# Patient Record
Sex: MALE | Race: WHITE | Employment: FULL TIME | URBAN - METROPOLITAN AREA
[De-identification: names, ages, dates, MRNs, and addresses within clinical notes are randomized per-mention and may not be internally consistent; named-entity substitution may affect disease eponyms.]

---

## 2019-08-20 ENCOUNTER — OFFICE VISIT (OUTPATIENT)
Dept: FAMILY MEDICINE | Facility: CLINIC | Age: 29
End: 2019-08-20
Payer: COMMERCIAL

## 2019-08-20 VITALS
TEMPERATURE: 97.8 F | BODY MASS INDEX: 23.55 KG/M2 | WEIGHT: 159 LBS | SYSTOLIC BLOOD PRESSURE: 124 MMHG | OXYGEN SATURATION: 97 % | RESPIRATION RATE: 14 BRPM | HEIGHT: 69 IN | HEART RATE: 59 BPM | DIASTOLIC BLOOD PRESSURE: 80 MMHG

## 2019-08-20 DIAGNOSIS — M75.41 IMPINGEMENT SYNDROME, SHOULDER, RIGHT: ICD-10-CM

## 2019-08-20 DIAGNOSIS — Z00.00 ROUTINE GENERAL MEDICAL EXAMINATION AT A HEALTH CARE FACILITY: Primary | ICD-10-CM

## 2019-08-20 PROCEDURE — 99213 OFFICE O/P EST LOW 20 MIN: CPT | Mod: 25 | Performed by: FAMILY MEDICINE

## 2019-08-20 PROCEDURE — 99385 PREV VISIT NEW AGE 18-39: CPT | Performed by: FAMILY MEDICINE

## 2019-08-20 RX ORDER — MELOXICAM 15 MG/1
15 TABLET ORAL DAILY
Qty: 30 TABLET | Refills: 1 | Status: SHIPPED | OUTPATIENT
Start: 2019-08-20 | End: 2020-09-01

## 2019-08-20 SDOH — HEALTH STABILITY: MENTAL HEALTH: HOW OFTEN DO YOU HAVE A DRINK CONTAINING ALCOHOL?: 2-4 TIMES A MONTH

## 2019-08-20 SDOH — HEALTH STABILITY: MENTAL HEALTH: HOW MANY STANDARD DRINKS CONTAINING ALCOHOL DO YOU HAVE ON A TYPICAL DAY?: 3 OR 4

## 2019-08-20 ASSESSMENT — MIFFLIN-ST. JEOR: SCORE: 1681.6

## 2019-08-20 NOTE — PROGRESS NOTES
SUBJECTIVE:   CC: Diony Salter is an 29 year old male who presents for preventative health visit.     Healthy Habits:     Getting at least 3 servings of Calcium per day:  NO    Bi-annual eye exam:  Yes    Dental care twice a year:  NO    Sleep apnea or symptoms of sleep apnea:  None    Diet:  Regular (no restrictions)    Frequency of exercise:  2-3 days/week    Duration of exercise:  Greater than 60 minutes    Taking medications regularly:  Yes    Medication side effects:  None    PHQ-2 Total Score: 1    Additional concerns today:  No      in addition to health maintenance patient would like to discuss the following problem:  ---    Also is having R shoulder pain, throwing pain, no pain with pretend throwing but actual throwing hurts  Has played baseball for most of his life  Playing in college  3 weeks ago faint, plays every Sunday  progressively worse    Severity: mild to moderate  Mechanism: just throwing  History of: nothing like this in the past  Left shoulder separation, snowboarding  Work related:   ROS: As per rest of visit.  Neuro: no significant weakness, numbness, tingling.    Problem specific EXAM  CV: Peripheral UE pulses are palpable.  L Shoulder Exam: full ROM circumduction. No effusion crepitus redness or deformity. No tenderness deltoid, AC, biceps groove, acromion, coracoid, supraspinatous, infraspinatous. Apprehension sign NEG.  R Shoulder exam: shows positive impingement signs are present with pain at high arc of abduction and forward flexion on right. There is tenderness of the lateral shoulder.  Focal Neurological exam of the arms reveals normal without focal findings. DTR's, motor strength and sensation normal.    X-ray is not indicated, no direct fall or trauma  ---    Discussed the natural history of throwing injuries  He is having some impingement type symptoms but also some possible tendinitis or bursitis  Because of a high velocity throwing a labral tear is also possibility  For now we  will focus on rehabilitation and refer him to physical therapy as well started a prescription anti-inflammatory    If this does not seem to help we will refer him to sports medicine for MRI and discussion of possible  surgical treatment      Today's PHQ-2 Score:   PHQ-2 ( 1999 [employer]) 8/20/2019   Q1: Little interest or pleasure in doing things 1   Q2: Feeling down, depressed or hopeless 0   PHQ-2 Score 1   Q1: Little interest or pleasure in doing things Several days   Q2: Feeling down, depressed or hopeless Not at all   PHQ-2 Score 1       Abuse: Current or Past(Physical, Sexual or Emotional)- No  Do you feel safe in your environment? Yes    Social History     Tobacco Use     Smoking status: Never Smoker     Smokeless tobacco: Current User     Types: Snuff   Substance Use Topics     Alcohol use: Yes     Frequency: 2-4 times a month     Drinks per session: 3 or 4     If you drink alcohol do you typically have >3 drinks per day or >7 drinks per week? No    Alcohol Use 8/20/2019   Prescreen: >3 drinks/day or >7 drinks/week? No   Prescreen: >3 drinks/day or >7 drinks/week? -   No flowsheet data found.    Last PSA: No results found for: PSA    Reviewed orders with patient. Reviewed health maintenance and updated orders accordingly - Yes  Lab work is in process  Labs reviewed in EPIC  BP Readings from Last 3 Encounters:   08/20/19 124/80    Wt Readings from Last 3 Encounters:   08/20/19 72.1 kg (159 lb)                  Patient Active Problem List   Diagnosis     Impingement syndrome, shoulder, right     No past surgical history on file.    Social History     Tobacco Use     Smoking status: Never Smoker     Smokeless tobacco: Current User     Types: Snuff   Substance Use Topics     Alcohol use: Yes     Frequency: 2-4 times a month     Drinks per session: 3 or 4     Family History   Problem Relation Age of Onset     Diabetes Mother      Diabetes Father            Reviewed and updated as needed this visit by clinical  "staff  Tobacco  Allergies  Meds  Fam Hx  Soc Hx        Reviewed and updated as needed this visit by Provider  Tobacco  Fam Hx  Soc Hx           Review of Systems  CONSTITUTIONAL: NEGATIVE for fever, chills, change in weight  INTEGUMENTARY/SKIN: NEGATIVE for worrisome rashes, moles or lesions  EYES: NEGATIVE for vision changes or irritation  ENT: NEGATIVE for ear, mouth and throat problems  RESP: NEGATIVE for significant cough or SOB  CV: NEGATIVE for chest pain, palpitations or peripheral edema  GI: NEGATIVE for nausea, abdominal pain, heartburn, or change in bowel habits   male: negative for dysuria, hematuria, decreased urinary stream, erectile dysfunction, urethral discharge  MUSCULOSKELETAL: NEGATIVE for significant arthralgias or myalgia except as noted above  NEURO: NEGATIVE for weakness, dizziness or paresthesias  PSYCHIATRIC: NEGATIVE for changes in mood or affect    OBJECTIVE:   /80   Pulse 59   Temp 97.8  F (36.6  C) (Oral)   Resp 14   Ht 1.753 m (5' 9\")   Wt 72.1 kg (159 lb)   SpO2 97%   BMI 23.48 kg/m      Physical Exam    General Appearance: Pleasant, alert, in no acute respiratory distress.  Head Exam: Normal. Normocephalic, atraumatic. No sinus tenderness.  Eye Exam: Normal external eye, conjunctiva, lids. MARINA.  Ear Exam: Normal auditory canals and external ears. Non-tender.  Left TM-normal. Right TM-normal.  OroPharynx Exam: Dental hygiene adequate. Normal buccal mucosa. Normal pharynx.  Neck Exam: Supple, no masses or enlarged, tender nodes.  Thyroid Exam: No nodules or enlargement or tenderness.  Chest/Respiratory Exam: Normal, comfortable, easy respirations. Chest wall normal. Lungs are clear to auscultation. No wheezing, crackles, or rhonchi.  Cardiovascular Exam: Regular rate and rhythm. No murmur, gallop, or rubs. No pedal edema.  Gastrointestinal Exam: Soft, non-tender, no masses or organomegaly.  Musculoskeletal Exam: Back is non-tender, full ROM of upper and lower " "extremities.  Except as noted above  Skin: no rash, warm and dry.    Neurologic Exam: Nonfocal, no tremor. Normal gait.  Psychiatric Exam: Alert - appropriate, normal affect    ASSESSMENT/PLAN:       ICD-10-CM    1. Routine general medical examination at a health care facility Z00.00    2. Impingement syndrome, shoulder, right M75.41 meloxicam (MOBIC) 15 MG tablet     [facility], [facility] CHIROPRACT[facility] REFERRAL     SPORTS MEDICINE REFERRAL       COUNSELING:   Reviewed preventive health counseling, as reflected in patient instructions       Regular exercise       Healthy diet/nutrition    Estimated body mass index is 23.48 kg/m  as calculated from the following:    Height as of this encounter: 1.753 m (5' 9\").    Weight as of this encounter: 72.1 kg (159 lb).          reports that he has never smoked. His smokeless tobacco use includes snuff.      Counseling Resources:  ATP IV Guidelines  Pooled Cohorts Equation Calculator  FRAX Risk Assessment  ICSI Preventive Guidelines  Dietary Guidelines for Americans, 2010  USDA's MyPlate  ASA Prophylaxis  Lung CA Screening    Vicente Chucho Vegas MD  [facility]  "

## 2019-08-22 NOTE — TELEPHONE ENCOUNTER
RECORDS RECEIVED FROM: Internal referral from Vicente Vegas MD for Impingement syndrome, shoulder, right - Schedule Per PT    DATE RECEIVED: 8/22   NOTES STATUS DETAILS   OFFICE NOTE from referring provider Internal    OFFICE NOTE from other specialist N/A    DISCHARGE SUMMARY from hospital N/A    DISCHARGE REPORT from the ER N/A    OPERATIVE REPORT N/A    MEDICATION LIST Internal    MRI N/A    CT SCAN N/A    XRAYS (IMAGES & REPORTS) N/A    TUMOR     PATHOLOGY  Slides & report N/A

## 2019-08-27 ENCOUNTER — OFFICE VISIT (OUTPATIENT)
Dept: ORTHOPEDICS | Facility: CLINIC | Age: 29
End: 2019-08-27
Attending: FAMILY MEDICINE
Payer: COMMERCIAL

## 2019-08-27 ENCOUNTER — PRE VISIT (OUTPATIENT)
Dept: ORTHOPEDICS | Facility: CLINIC | Age: 29
End: 2019-08-27

## 2019-08-27 VITALS — WEIGHT: 159 LBS | BODY MASS INDEX: 23.55 KG/M2 | HEIGHT: 69 IN

## 2019-08-27 DIAGNOSIS — M25.511 PAIN IN JOINT OF RIGHT SHOULDER: Primary | ICD-10-CM

## 2019-08-27 ASSESSMENT — PAIN SCALES - GENERAL: PAINLEVEL: NO PAIN (0)

## 2019-08-27 ASSESSMENT — MIFFLIN-ST. JEOR: SCORE: 1681.6

## 2019-08-27 NOTE — LETTER
"  8/27/2019      RE: Diony Salter  [address]  [facility] [address]       Pt is a 29 year old male referred by Dr Vegas here today for:     HPI:   Right Shoulder pain:   Location: \"depends on what position in throw\" - ends up going around entire shoulder   Duration: about 8 weeks   Injury? Inciting activity? Nothing specific, just from use   Radiation: none   Numbness/tingling? Yes, during games but not at rest   Weakness? No   Instability? No   Clicking/ catching? No   Imaging? None   Treatment? 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  Neuro: no significant weakness, numbness, tingling.    R Shoulder exam: shows positive impingement signs are present with pain at high arc of abduction and forward flexion on right. There is tenderness of the lateral shoulder.\"    No past medical history on file.   No past surgical history on file.   Current Outpatient Medications   Medication Sig Dispense Refill     meloxicam (MOBIC) 15 MG tablet Take 1 tablet (15 mg) by mouth daily 30 tablet 1      No Known Allergies   Social History     Tobacco Use     Smoking status: Never Smoker     Smokeless tobacco: Current User     Types: Snuff   Substance Use Topics     Alcohol use: Yes     Frequency: 2-4 times a month     Drinks per session: 3 or 4     Drug use: Never      Family History   Problem Relation Age of Onset     Diabetes Mother      Diabetes Father       ROS:   Gen- no fevers/chills   Rheum - no morning stiffness   Derm - no rash/ redness   Neuro - no " "numbness, no tingling   Remainder of ROS negative.     Exam:   Ht 1.753 m (5' 9\")   Wt 72.1 kg (159 lb)   BMI 23.48 kg/m          Right Shoulder:   Inspection: asymmetry? None; dyskinesis? None, ecchymosis on right bicep and in axilla   ROM:   Active - forward flexion - full/ abduction - full/ int rot - symmetric/ ext rot - symmetric   Passive- forward flexion - full/ abduction - full   Strength: deltoid/supraspinatous - 5/5; infraspinatous/ teres minor - 5/5; subscapularis - 5/5   Maneuvers:   RTC - empty can - trace positive; painful arc - neg; lift off - neg   Impingement - Martinez -neg; Neer- neg   AC - cross arm - neg   Labrum - Mccall's - ++; Leger shear - neg; apprehension - ++  Palpation: AC - neg; Acromion/ supraspinatus - neg; scapula - neg; bicipital groove - neg       (M25.511) Pain in joint of right shoulder  (primary encounter diagnosis)  Comment: lengthy discussion regarding his exam; unclear etiology of his axillary ecchymoses. Pec is intact. His exam is consistent w/ labral pathology. Recommended trial of PT to focus both on shoulder and throwing mechanics; f/u in 2 months; if no better, would get imaging  Plan: PHYSICAL THERAPY REFERRAL (Internal)            Shashi Patel MD  August 27, 2019  3:00 PM      Shashi Patel MD    "

## 2019-08-27 NOTE — PROGRESS NOTES
"Pt is a 29 year old male referred by Dr Vegas here today for:     HPI:   Right Shoulder pain:   Location: \"depends on what position in throw\" - ends up going around entire shoulder   Duration: about 8 weeks   Injury? Inciting activity? Nothing specific, just from use   Radiation: none   Numbness/tingling? Yes, during games but not at rest   Weakness? No   Instability? No   Clicking/ catching? No   Imaging? None   Treatment? 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  Neuro: no significant weakness, numbness, tingling.    R Shoulder exam: shows positive impingement signs are present with pain at high arc of abduction and forward flexion on right. There is tenderness of the lateral shoulder.\"    No past medical history on file.   No past surgical history on file.   Current Outpatient Medications   Medication Sig Dispense Refill     meloxicam (MOBIC) 15 MG tablet Take 1 tablet (15 mg) by mouth daily 30 tablet 1      No Known Allergies   Social History     Tobacco Use     Smoking status: Never Smoker     Smokeless tobacco: Current User     Types: Snuff   Substance Use Topics     Alcohol use: Yes     Frequency: 2-4 times a month     Drinks per session: 3 or 4     Drug use: Never      Family History   Problem Relation Age of Onset     Diabetes Mother      Diabetes Father       ROS:   Gen- no fevers/chills   Rheum - no morning stiffness   Derm - no rash/ redness   Neuro - no numbness, no tingling   Remainder of ROS negative.     Exam:   Ht 1.753 m (5' 9\")   Wt " 72.1 kg (159 lb)   BMI 23.48 kg/m         Right Shoulder:   Inspection: asymmetry? None; dyskinesis? None, ecchymosis on right bicep and in axilla   ROM:   Active - forward flexion - full/ abduction - full/ int rot - symmetric/ ext rot - symmetric   Passive- forward flexion - full/ abduction - full   Strength: deltoid/supraspinatous - 5/5; infraspinatous/ teres minor - 5/5; subscapularis - 5/5   Maneuvers:   RTC - empty can - trace positive; painful arc - neg; lift off - neg   Impingement - Martinez -neg; Neer- neg   AC - cross arm - neg   Labrum - Mccall's - ++; Leger shear - neg; apprehension - ++  Palpation: AC - neg; Acromion/ supraspinatus - neg; scapula - neg; bicipital groove - neg       (M25.511) Pain in joint of right shoulder  (primary encounter diagnosis)  Comment: lengthy discussion regarding his exam; unclear etiology of his axillary ecchymoses. Pec is intact. His exam is consistent w/ labral pathology. Recommended trial of PT to focus both on shoulder and throwing mechanics; f/u in 2 months; if no better, would get imaging  Plan: PHYSICAL THERAPY REFERRAL (Internal)            Shashi Patel MD  August 27, 2019  3:00 PM

## 2019-08-30 PROBLEM — M25.511 RIGHT SHOULDER PAIN: Status: ACTIVE | Noted: 2019-08-30

## 2020-03-11 ENCOUNTER — HEALTH MAINTENANCE LETTER (OUTPATIENT)
Age: 30
End: 2020-03-11

## 2020-09-01 ENCOUNTER — OFFICE VISIT (OUTPATIENT)
Dept: FAMILY MEDICINE | Facility: CLINIC | Age: 30
End: 2020-09-01
Payer: COMMERCIAL

## 2020-09-01 VITALS
HEIGHT: 69 IN | RESPIRATION RATE: 17 BRPM | BODY MASS INDEX: 23.99 KG/M2 | TEMPERATURE: 97.6 F | WEIGHT: 162 LBS | OXYGEN SATURATION: 96 % | HEART RATE: 79 BPM | DIASTOLIC BLOOD PRESSURE: 82 MMHG | SYSTOLIC BLOOD PRESSURE: 123 MMHG

## 2020-09-01 DIAGNOSIS — F51.02 ADJUSTMENT INSOMNIA: ICD-10-CM

## 2020-09-01 DIAGNOSIS — F41.9 ANXIETY: Primary | ICD-10-CM

## 2020-09-01 PROCEDURE — 99214 OFFICE O/P EST MOD 30 MIN: CPT | Performed by: FAMILY MEDICINE

## 2020-09-01 RX ORDER — ESCITALOPRAM OXALATE 10 MG/1
10 TABLET ORAL DAILY
Qty: 30 TABLET | Refills: 5 | Status: SHIPPED | OUTPATIENT
Start: 2020-09-01

## 2020-09-01 ASSESSMENT — PATIENT HEALTH QUESTIONNAIRE - PHQ9
5. POOR APPETITE OR OVEREATING: SEVERAL DAYS
SUM OF ALL RESPONSES TO PHQ QUESTIONS 1-9: 16

## 2020-09-01 ASSESSMENT — ANXIETY QUESTIONNAIRES
IF YOU CHECKED OFF ANY PROBLEMS ON THIS QUESTIONNAIRE, HOW DIFFICULT HAVE THESE PROBLEMS MADE IT FOR YOU TO DO YOUR WORK, TAKE CARE OF THINGS AT HOME, OR GET ALONG WITH OTHER PEOPLE: SOMEWHAT DIFFICULT
5. BEING SO RESTLESS THAT IT IS HARD TO SIT STILL: MORE THAN HALF THE DAYS
3. WORRYING TOO MUCH ABOUT DIFFERENT THINGS: NEARLY EVERY DAY
1. FEELING NERVOUS, ANXIOUS, OR ON EDGE: SEVERAL DAYS
7. FEELING AFRAID AS IF SOMETHING AWFUL MIGHT HAPPEN: SEVERAL DAYS
GAD7 TOTAL SCORE: 12
6. BECOMING EASILY ANNOYED OR IRRITABLE: SEVERAL DAYS
2. NOT BEING ABLE TO STOP OR CONTROL WORRYING: NEARLY EVERY DAY

## 2020-09-01 ASSESSMENT — MIFFLIN-ST. JEOR: SCORE: 1690.21

## 2020-09-01 NOTE — PROGRESS NOTES
"Subjective     Diony Salter is a 30 year old male who presents to clinic today for the following health issues:    HPI       Depression and Anxiety Follow-Up    How are you doing with your depression since your last visit? Worsened slightly     How are you doing with your anxiety since your last visit?  Worsened slightly     Are you having other symptoms that might be associated with depression or anxiety? Yes:  panic attacks     Have you had a significant life event? Relationship Concerns     Do you have any concerns with your use of alcohol or other drugs? Yes:  chew tobacco concerns     Social History     Tobacco Use     Smoking status: Never Smoker     Smokeless tobacco: Current User     Types: Snuff   Substance Use Topics     Alcohol use: Yes     Frequency: 2-4 times a month     Drinks per session: 3 or 4     Drug use: Never     No flowsheet data found.  No flowsheet data found.       Suicide Assessment Five-step Evaluation and Treatment (SAFE-T)      How many servings of fruits and vegetables do you eat daily?  0-1    On average, how many sweetened beverages do you drink each day (Examples: soda, juice, sweet tea, etc.  Do NOT count diet or artificially sweetened beverages)?   0-1    How many days per week do you exercise enough to make your heart beat faster? 3 or less    How many minutes a day do you exercise enough to make your heart beat faster? 20 - 29    How many days per week do you miss taking your medication? 0    Was seen in ER in [place], had recently moved to [place], was doing work out there  Just had a break up  Was taking clonazepam and it made him dopey    Review of Systems   CONSTITUTIONAL: NEGATIVE for fever, chills, change in weight  RESP: NEGATIVE for significant cough or SOB      Objective    /82   Pulse 79   Temp 97.6  F (36.4  C) (Tympanic)   Resp 17   Ht 1.753 m (5' 9\")   Wt 73.5 kg (162 lb)   SpO2 96%   BMI 23.92 kg/m    Body mass index is 23.92 kg/m .  Physical Exam "     GROOMING: Adequately groomed.  ATTIRE: Appropriate.  AGE: Appears stated.  BEHAVIOR TOWARDS EXAMINER: Cooperative.  MOTOR ACTIVITY: Unremarkable.  EYE CONTACT: Appropriate.  Mood:  anxious  Affect:  appropriate and in normal range  SPEECH/LANGUAGE: Unremarkable.  ATTENTION: Unremarkable.  CONCENTRATION: Unremarkable.  THOUGHT PROCESS: Unremarkable  THOUGHT CONTENT: Unremarkable for hallucinations and delusions.  ORIENTATION: Times 3.  MEMORY: No evidence of impairment.  JUDGMENT: No evidence of impairment.  ESTIMATED INTELLIGENCE: Average.  DEMONSTRATED INSIGHT: Adequate.  FUND OF KNOWLEDGE: Adequate.  SUICIDE RISK: None apparent and denied.        Assessment & Plan     1. Anxiety  New medication  - escitalopram (LEXAPRO) 10 MG tablet; Take 1 tablet (10 mg) by mouth daily  Dispense: 30 tablet; Refill: 5    2. Adjustment insomnia  Ok to continue unisom         Tobacco Cessation:   reports that he has never smoked. His smokeless tobacco use includes snuff.    Vicente Vegas MD  [facility]

## 2020-09-02 ASSESSMENT — ANXIETY QUESTIONNAIRES: GAD7 TOTAL SCORE: 12

## 2021-01-03 ENCOUNTER — HEALTH MAINTENANCE LETTER (OUTPATIENT)
Age: 31
End: 2021-01-03

## 2021-10-10 ENCOUNTER — HEALTH MAINTENANCE LETTER (OUTPATIENT)
Age: 31
End: 2021-10-10

## 2022-01-30 ENCOUNTER — HEALTH MAINTENANCE LETTER (OUTPATIENT)
Age: 32
End: 2022-01-30

## 2022-09-18 ENCOUNTER — HEALTH MAINTENANCE LETTER (OUTPATIENT)
Age: 32
End: 2022-09-18

## 2023-05-07 ENCOUNTER — HEALTH MAINTENANCE LETTER (OUTPATIENT)
Age: 33
End: 2023-05-07